# Patient Record
Sex: MALE | Race: WHITE
[De-identification: names, ages, dates, MRNs, and addresses within clinical notes are randomized per-mention and may not be internally consistent; named-entity substitution may affect disease eponyms.]

---

## 2018-02-01 ENCOUNTER — HOSPITAL ENCOUNTER (EMERGENCY)
Dept: HOSPITAL 31 - C.ER | Age: 68
Discharge: LEFT BEFORE DISCHARGE | End: 2018-02-01
Payer: COMMERCIAL

## 2018-02-01 VITALS
TEMPERATURE: 99.1 F | OXYGEN SATURATION: 96 % | HEART RATE: 86 BPM | SYSTOLIC BLOOD PRESSURE: 107 MMHG | DIASTOLIC BLOOD PRESSURE: 72 MMHG | RESPIRATION RATE: 16 BRPM

## 2018-02-01 VITALS — BODY MASS INDEX: 35.5 KG/M2

## 2018-02-01 DIAGNOSIS — Z02.89: Primary | ICD-10-CM

## 2018-02-01 DIAGNOSIS — F11.10: ICD-10-CM

## 2018-02-03 ENCOUNTER — HOSPITAL ENCOUNTER (EMERGENCY)
Dept: HOSPITAL 31 - C.ER | Age: 68
Discharge: HOME | End: 2018-02-03
Payer: MEDICARE

## 2018-02-03 VITALS
TEMPERATURE: 98.9 F | OXYGEN SATURATION: 99 % | HEART RATE: 57 BPM | DIASTOLIC BLOOD PRESSURE: 63 MMHG | SYSTOLIC BLOOD PRESSURE: 125 MMHG | RESPIRATION RATE: 18 BRPM

## 2018-02-03 VITALS — BODY MASS INDEX: 35.5 KG/M2

## 2018-02-03 DIAGNOSIS — F19.10: Primary | ICD-10-CM

## 2018-02-03 NOTE — C.PDOC
History Of Present Illness


67 year old male presents to the emergency room requesting detox. Patient has 

history of substance abuse. He offers no physical complaints at this time. 

Denies having suicidal or homicidal ideations.





PMD: None





Time Seen by Provider: 02/03/18 13:28


Chief Complaint (Nursing): Substance Abuse


History Per: Patient


History/Exam Limitations: no limitations





Past Medical History


Reviewed: Historical Data, Nursing Documentation, Vital Signs


Vital Signs: 


 Last Vital Signs











Temp  98.9 F   02/03/18 13:02


 


Pulse  57 L  02/03/18 13:02


 


Resp  18   02/03/18 13:02


 


BP  125/63   02/03/18 13:02


 


Pulse Ox  99   02/03/18 14:20














- Medical History


PMH: Asthma, Depression, HTN, Seizures


   Denies: Chronic Kidney Disease





- [facility] Procedures








INJECT/INFUSE NEC (03/24/15)








Family History: States: No Known Family Hx





- Social History


Hx Tobacco Use: No


Hx Alcohol Use: No


Hx Substance Use: Yes (THIS MORNING)





- Immunization History


Hx Tetanus Toxoid Vaccination: No


Hx Influenza Vaccination: No


Hx Pneumococcal Vaccination: No





Review Of Systems


Except As Marked, All Systems Reviewed And Found Negative.


Constitutional: Negative for: Fever, Chills


Psych: Negative for: Suicidal ideation (and homicidal)





Physical Exam





- Physical Exam


Appears: Well, Non-toxic, No Acute Distress


Skin: Normal Color, Warm, Dry


Head: Atraumatic, Normacephalic


Eye(s): bilateral: Normal Inspection


Oral Mucosa: Moist


Neck: Normal, Normal ROM


Chest: Symmetrical


Respiratory: No Accessory Muscle Use


Extremity: Normal ROM, No Deformity


Neurological/Psych: Oriented x3, Normal Speech





ED Course And Treatment


O2 Sat by Pulse Oximetry: 99 (RA)


Pulse Ox Interpretation: Normal


Progress Note: Case discussed with crisis worker who provided patient with a 

list of outside services available


Reassessment Condition: Unchanged





Medical Decision Making


Medical Decision Making: 





Time: 13:17


Initial Plan:


No detox beds are available today.


Patient seen by crisis worker and provided with outpatient resources. Advised 

to return to ER for any worsening symptoms. 





Disposition


Counseled Patient/Family Regarding: Diagnosis, Need For Followup





- Disposition


Referrals: 


Alcoholics Anonymous [Outside]


[facility] [Outside]


[facility] [Outside]


Disposition: HOME/ ROUTINE


Disposition Time: 14:01


Condition: STABLE


Additional Instructions: 


Follow up for outpatient services


Instructions:  Narcotic Abuse (ED)


Forms:  [facility] (English)





- POA


Present On Arrival: None





- Clinical Impression


Clinical Impression: 


 Substance abuse








- PA / NP / Resident Statement


MD/DO has reviewed & agrees with the documentation as recorded.





- Scribe Statement


The provider has reviewed the documentation as recorded by the Scribe (Georgia Castrejon)





All medical record entries made by the Scribe were at my direction and 

personally dictated by me. I have reviewed the chart and agree that the record 

accurately reflects my personal performance of the history, physical exam, 

medical decision making, and the department course for this patient. I have 

also personally directed, reviewed, and agree with the discharge instructions 

and disposition.

## 2018-02-08 ENCOUNTER — HOSPITAL ENCOUNTER (INPATIENT)
Dept: HOSPITAL 31 - C.ER | Age: 68
LOS: 5 days | Discharge: HOME | DRG: 895 | End: 2018-02-13
Attending: PSYCHIATRY & NEUROLOGY | Admitting: PSYCHIATRY & NEUROLOGY
Payer: COMMERCIAL

## 2018-02-08 VITALS — BODY MASS INDEX: 35.5 KG/M2

## 2018-02-08 DIAGNOSIS — G40.909: ICD-10-CM

## 2018-02-08 DIAGNOSIS — F11.23: Primary | ICD-10-CM

## 2018-02-08 DIAGNOSIS — F17.210: ICD-10-CM

## 2018-02-08 DIAGNOSIS — I10: ICD-10-CM

## 2018-02-08 DIAGNOSIS — R00.1: ICD-10-CM

## 2018-02-08 DIAGNOSIS — Z79.899: ICD-10-CM

## 2018-02-08 DIAGNOSIS — F32.9: ICD-10-CM

## 2018-02-08 DIAGNOSIS — J45.909: ICD-10-CM

## 2018-02-08 LAB
ALBUMIN SERPL-MCNC: 4.6 G/DL (ref 3.5–5)
ALBUMIN/GLOB SERPL: 1.1 {RATIO} (ref 1–2.1)
ALT SERPL-CCNC: 33 U/L (ref 21–72)
AST SERPL-CCNC: 24 U/L (ref 17–59)
BASOPHILS # BLD AUTO: 0.1 K/UL (ref 0–0.2)
BASOPHILS NFR BLD: 0.7 % (ref 0–2)
BILIRUB UR-MCNC: NEGATIVE MG/DL
BUN SERPL-MCNC: 16 MG/DL (ref 9–20)
CALCIUM SERPL-MCNC: 9.9 MG/DL (ref 8.6–10.4)
CAOX CRY #/AREA URNS HPF: (no result) /HPF
COLOR UR: YELLOW
EOSINOPHIL # BLD AUTO: 0.1 K/UL (ref 0–0.7)
EOSINOPHIL NFR BLD: 1 % (ref 0–4)
ERYTHROCYTE [DISTWIDTH] IN BLOOD BY AUTOMATED COUNT: 13.2 % (ref 11.5–14.5)
GFR NON-AFRICAN AMERICAN: > 60
GLUCOSE UR STRIP-MCNC: NORMAL MG/DL
HGB BLD-MCNC: 13.8 G/DL (ref 12–18)
LEUKOCYTE ESTERASE UR-ACNC: (no result) LEU/UL
LYMPHOCYTES # BLD AUTO: 2.5 K/UL (ref 1–4.3)
LYMPHOCYTES NFR BLD AUTO: 19.6 % (ref 20–40)
MCH RBC QN AUTO: 30.1 PG (ref 27–31)
MCHC RBC AUTO-ENTMCNC: 34.2 G/DL (ref 33–37)
MCV RBC AUTO: 88.2 FL (ref 80–94)
MONOCYTES # BLD: 0.8 K/UL (ref 0–0.8)
MONOCYTES NFR BLD: 6.5 % (ref 0–10)
NEUTROPHILS # BLD: 9.2 K/UL (ref 1.8–7)
NEUTROPHILS NFR BLD AUTO: 72.2 % (ref 50–75)
NRBC BLD AUTO-RTO: 0.2 % (ref 0–2)
PH UR STRIP: 5 [PH] (ref 5–8)
PLATELET # BLD: 246 K/UL (ref 130–400)
PMV BLD AUTO: 8.3 FL (ref 7.2–11.7)
PROT UR STRIP-MCNC: (no result) MG/DL
RBC # BLD AUTO: 4.59 MIL/UL (ref 4.4–5.9)
RBC # UR STRIP: NEGATIVE /UL
SP GR UR STRIP: 1.03 (ref 1–1.03)
SQUAMOUS EPITHIAL: < 1 /HPF (ref 0–5)
URINE NITRATE: NEGATIVE
UROBILINOGEN UR-MCNC: 2 MG/DL (ref 0.2–1)
WBC # BLD AUTO: 12.7 K/UL (ref 4.8–10.8)

## 2018-02-08 PROCEDURE — HZ2ZZZZ DETOXIFICATION SERVICES FOR SUBSTANCE ABUSE TREATMENT: ICD-10-PCS | Performed by: PSYCHIATRY & NEUROLOGY

## 2018-02-08 PROCEDURE — HZ56ZZZ INDIVIDUAL PSYCHOTHERAPY FOR SUBSTANCE ABUSE TREATMENT, PSYCHOEDUCATION: ICD-10-PCS | Performed by: PSYCHIATRY & NEUROLOGY

## 2018-02-08 PROCEDURE — HZ59ZZZ INDIVIDUAL PSYCHOTHERAPY FOR SUBSTANCE ABUSE TREATMENT, SUPPORTIVE: ICD-10-PCS | Performed by: PSYCHIATRY & NEUROLOGY

## 2018-02-08 NOTE — PCM.BM
<Vane Gonsalez - Last Filed: 02/08/18 17:44>





Treatment Plan Problems





- Problems identified on initial assessmt


  ** Potential for opiate withdrawal


Date Initiated: 02/08/18


Time Initiated: 17:45


Assessment reference: NA


Status: Active





Treatment assets and liabiliti


Patient Assests: ADL independent, negotiates basic needs, cognitively intact


Patient Liabilities: substance abuse, medical problems (HTN, prsotate 

enlargement), other (Wife primary caregiver)





- Milieu Protocol


Maintain good personal hygiene: daily Encourage regular showers, daily Remind 

patient to perform daily oral care, daily Assist patient to perform ADL's


Conduct patient checks and document Observation sheet: Q15 minutes


Maintain personal safety: every shift Educate patient to report safety concerns 

to staff, every shift Monitor environment for contraband/sharps


Medication safety: Monitor for expected outcome, potential side effects: every 

shift, Assess barriers to learning: every shift, Assess readiness for 

medication education: every shift





<Alanna Wolff - Last Filed: 02/09/18 08:54>





- Diagnosis


(1) Opioid use disorder, severe, dependence


Status: Acute   


Interventions: 





02/09/18 08:54


* Assess 7x/week regarding severity of withdrawal


* Educate regarding risks, benefits, side effects and alternatives of 

medications


* Use Motivational Interviewing for abstinence


* Use CBT for relapse prevention


* Medication management for withdrawal symptoms


* Encourage medication assisted treatment


*

## 2018-02-08 NOTE — C.PDOC
History Of Present Illness


66 y/o male presents to the ED requesting heroin detox. Patient admits to IV 

heroin abuse, stating he used around 8-10 bags earlier today. Patient denies 

any other drug use. He denies fever, chills, or any other physical complaints 

at this time. 


Chief Complaint (Nursing): Substance Abuse


History Per: Patient


History/Exam Limitations: no limitations


Onset/Duration Of Symptoms: Hrs


Current Symptoms Are (Timing): Still Present


Suicide/Self Injury Attempted (Context): None


Modifying Factor(s): Narcotics (heroin)


Associated Symptoms: denies: Suicidal Thoughts, Suicidal Plan


Involuntary Hold By: None


Recent travel outside of the [place]: No


Additional History Per: Patient





Past Medical History


Reviewed: Historical Data, Nursing Documentation, Vital Signs


Vital Signs: 


 Last Vital Signs











Temp  99.0 F   02/09/18 09:00


 


Pulse  56 L  02/09/18 09:00


 


Resp  18   02/09/18 09:00


 


BP  135/77   02/09/18 10:05


 


Pulse Ox  98   02/09/18 09:00














- Medical History


PMH: Asthma, Depression, HTN, Seizures


   Denies: Diabetes, Hepatitis, HIV, Chronic Kidney Disease, Sexually 

Transmitted Disease


Surgical History: No Surg Hx





- [facility] Procedures








INJECT/INFUSE NEC (03/24/15)








Family History: States: Unknown Family Hx





- Social History


Hx Tobacco Use: No


Hx Alcohol Use: No


Hx Substance Use: Yes (THIS MORNING)





- Immunization History


Hx Tetanus Toxoid Vaccination: No


Hx Influenza Vaccination: No


Hx Pneumococcal Vaccination: No





Review Of Systems


Psych: Positive for: Other (IV heroin detox ).  Negative for: Suicidal ideation





Physical Exam





- Physical Exam


Appears: Non-toxic, No Acute Distress


Skin: Normal Color, Warm, Dry


Head: Atraumatic, Normacephalic


Eye(s): bilateral: Normal Inspection


Oral Mucosa: Moist


Neck: Supple


Chest: Symmetrical, No Deformity, No Tenderness


Cardiovascular: Rhythm Regular, No Murmur


Respiratory: Normal Breath Sounds, No Rales, No Rhonchi, No Wheezing


Extremity: Normal ROM, Capillary Refill (less than 2 seconds )


Neurological/Psych: Oriented x3, Normal Speech, Normal Cognition


Gait: Steady





ED Course And Treatment





- Laboratory Results


Result Diagrams: 


 02/08/18 16:12





 02/08/18 16:12


O2 Sat by Pulse Oximetry: 98 (on RA)


Pulse Ox Interpretation: Normal





Medical Decision Making


Medical Decision Making: 


Progress:


Bloodwork and urinalysis ordered and reviewed. 








Disposition





- Disposition


Disposition: HOSPITALIZED


Disposition Time: 17:00


Condition: STABLE





- Clinical Impression


Clinical Impression: 


 Drug dependence, Drug abuse








- Scribe Statement


The provider has reviewed the documentation as recorded by the Scribe (Ivelisse Hanna)


Provider Attestation: 








All medical record entries made by the Scribe were at my direction and 

personally dictated by me. I have reviewed the chart and agree that the record 

accurately reflects my personal performance of the history, physical exam, 

medical decision making, and the department course for this patient. I have 

also personally directed, reviewed, and agree with the discharge instructions 

and disposition.

## 2018-02-09 RX ADMIN — PANTOPRAZOLE SODIUM SCH MG: 20 TABLET, DELAYED RELEASE ORAL at 10:05

## 2018-02-09 NOTE — PCM.PSYCH
Initial Psychiatric Evaluation





- Initial Psychiatric Evaluation


Type of Admission: Voluntary


Legal Status: Capacity


Chief Complaint (in patient's own words): 





"My stomach hurts"


History of Present Illness and Precipitating Events: 





The patient is seen chart reviewed and case discussed.


This is a 67-year-old  male,  with no children, retired, lives 

with wife who also uses heroin. He says that after he is done, his wife will 

come for detox.


The patient is using 10 bags heroin intravenously for the last 5 years. He 

denies using methadone or other painkillers.


He was introduced to heroin when his wife was sick and a friend gave heroin to 

her and then he started use as well, when he fell and had pain.


No past history of any opiate use and also he denies all other drug and alcohol 

use.


No treatment history.


He smokes 3-4 cigarettes a day.





Past psych history: Denies





Family psych history: Denies





Medical history: 1 history of seizure, hypertension, he had diverticulitis last 

year and treated with antibiotics.


Current Medications: 





Active Medications











Generic Name Dose Route Start Last Admin





  Trade Name Freq  PRN Reason Stop Dose Admin


 


Al Hydrox/Mg Hydrox/Simethicone  30 ml  02/08/18 21:24  





  Maalox 30 Ml  PO   





  TID PRN   





  Indigestion / Heartburn   


 


Aspirin  81 mg  02/09/18 10:00  02/09/18 10:05





  Ecotrin  PO   81 mg





  DAILY ROBERT   Administration


 


Benzocaine/Menthol  1 arnie  02/08/18 21:24  





  Cepacol Sore Throat  PO   





  QID PRN   





  Sore Throat   


 


Buprenorphine HCl  0 mg  02/10/18 11:19  





  Subutex  SL  02/17/18 11:18  





  .TAPER ROBERT   





  Taper   


 


Buprenorphine HCl  0 mg  02/10/18 10:00  





  Subutex  SL  02/15/18 09:59  





  DAILY ROBERT   





  Taper   


 


Clonidine HCl  0.1 mg  02/08/18 21:24  





  Catapres  PO   





  Q8 PRN   





  COWS Score More or Equal to 5   


 


Dicyclomine HCl  10 mg  02/09/18 09:57  02/09/18 10:05





  Bentyl  PO   10 mg





  Q6H PRN   Administration





  spasms   


 


Hydroxyzine HCl  25 mg  02/08/18 18:24  





  Atarax  PO   





  Q6 PRN   





  Anxiety   


 


Ibuprofen  600 mg  02/08/18 21:23  02/09/18 08:03





  Motrin Tab  PO   600 mg





  Q6H PRN   Administration





  Pain, Mild (1-3)   


 


Levetiracetam  500 mg  02/09/18 10:00  02/09/18 09:39





  Keppra  PO   500 mg





  BID ROBERT   Administration


 


Loperamide HCl  2 mg  02/08/18 21:24  





  Imodium  PO   





  Q8 PRN   





  Diarrhea   


 


Metoprolol Tartrate  25 mg  02/09/18 10:00  02/09/18 10:05





  Lopressor  PO   25 mg





  BID ROBERT   Administration


 


Ondansetron HCl  4 mg  02/08/18 21:24  





  Zofran Tab  PO   





  Q8 PRN   





  Nausea/Vomiting   


 


Pantoprazole Sodium  20 mg  02/09/18 10:00  02/09/18 10:05





  Protonix Ec Tab  PO   20 mg





  DAILY ROBERT   Administration


 


Pneumococcal Polyvalent Vaccine  0.5 ml  02/10/18 09:00  





  Pneumovax 23 Vaccine  IM  02/10/18 09:01  





  .ONCE ONE   


 


Pseudoephedrine HCl  60 mg  02/08/18 21:24  





  Sudafed Tab  PO   





  QID PRN   





  Nasal/Sinus Congestion   


 


Trazodone HCl  50 mg  02/08/18 18:23  





  Desyrel  PO   





  HS PRN   





  insomnia   














Past Psychiatric History





- Past Psychiatric History


Previous Treatment History: None


Pertinent Medical Hx (Current Medical&Sleep Prob, Allergies): 





 Allergies











Allergy/AdvReac Type Severity Reaction Status Date / Time


 


Penicillins Allergy   Verified 02/08/18 15:12








 





Levetiracetam [Keppra] 500 mg PO BID 07/07/14 


Aspirin 81 mg PO DAILY 03/24/15 


Ibuprofen [Motrin Tab] 600 mg PO Q6H PRN #30 tab 03/24/15 











Review of Systems





- Gastrointestinal


Gastrointestinal: Abdominal Pain





- Neurological


Neurological: UNREMARKABLE





- Psychiatric


Psychiatric: Abnormal Sleep Pattern, Anhedonia, Anxiety, Difficulty 

Concentrating, Irritability.  absent: Hallucinations, Homicidal Ideation, 

Suicidal Ideation





Mental Status Examination





- Personal Presentation


Personal Presentation: Looks stated age





- Affect


Affect: Constricted





- Motor Activity


Motor Activity: Calm





- Reliability in Providing Information


Reliability in Providing Information: Good





- Speech


Speech: Organized





- Mood


Mood: Anxious





- Formal Thought Process


Formal Thought Process: No Impairment





- Cognitive Functions


Orientation: Person, Place, Situation, Time


Sensorium: Alert


Attention/Concentration: Easily distracted


Estimate of Intelligence: Average


Judgement: Intact, as evidence by: Insight regarding need for hospitalization


Memory: Recent intact, as evidence by: Ability to recall events of the day, 

Remote intact, as evidenced by: Abilit to recall sig. life events





- Risk


Risk: Withdrawal, Diminished functioning





- Strength & Assets Inventory


Strength & Assets Inventory: Family support, Cooperative





- Limitations


Limitations: Other





DSM 5 DX





- DSM 5


DSM 5 Diagnosis: 





Opioid withdrawal


Opioid use d/o -severe








- Recommended/Plan of Treatment


Treatment Recommendations and Plan of Treatment: 





Subutex detox started 8 mg last nidnight, 8 mg this afternoon and will taper off


All risks, benefits and alternatives of the meds discussed,


 and the pt agreed and understood. 


Attend groups and activities


Individual therapy daily


Psychoeducation and support daily


Encourage compliance with meds and after care


Refer to outpatient program 


Teach healthy lifestyle methods, i.e. diet, exercise, meditation


Smoking cessation 


GI cons if pain does not improve


Protonix, prn bentyl


Consider CT scan





32 min


Projected ELOS: 6 days


Prognosis: good w treatment





- Smoking Cessation


Smoking Cessation Initiated: Yes

## 2018-02-10 LAB
ALBUMIN SERPL-MCNC: 4.1 G/DL (ref 3.5–5)
ALBUMIN/GLOB SERPL: 1.1 {RATIO} (ref 1–2.1)
ALT SERPL-CCNC: 32 U/L (ref 21–72)
AMYLASE SERPL-CCNC: 159 U/L (ref 30–110)
AST SERPL-CCNC: 22 U/L (ref 17–59)
BASOPHILS # BLD AUTO: 0.1 K/UL (ref 0–0.2)
BASOPHILS NFR BLD: 0.6 % (ref 0–2)
BUN SERPL-MCNC: 19 MG/DL (ref 9–20)
CALCIUM SERPL-MCNC: 9.6 MG/DL (ref 8.6–10.4)
EOSINOPHIL # BLD AUTO: 0.2 K/UL (ref 0–0.7)
EOSINOPHIL NFR BLD: 1.5 % (ref 0–4)
ERYTHROCYTE [DISTWIDTH] IN BLOOD BY AUTOMATED COUNT: 13.2 % (ref 11.5–14.5)
GFR NON-AFRICAN AMERICAN: > 60
HGB BLD-MCNC: 12.8 G/DL (ref 12–18)
LIPASE: 170 U/L (ref 23–300)
LYMPHOCYTES # BLD AUTO: 3.2 K/UL (ref 1–4.3)
LYMPHOCYTES NFR BLD AUTO: 27.4 % (ref 20–40)
MAGNESIUM SERPL-MCNC: 1.9 MG/DL (ref 1.6–2.3)
MCH RBC QN AUTO: 30 PG (ref 27–31)
MCHC RBC AUTO-ENTMCNC: 33.7 G/DL (ref 33–37)
MCV RBC AUTO: 89.3 FL (ref 80–94)
MONOCYTES # BLD: 0.8 K/UL (ref 0–0.8)
MONOCYTES NFR BLD: 6.8 % (ref 0–10)
NEUTROPHILS # BLD: 7.5 K/UL (ref 1.8–7)
NEUTROPHILS NFR BLD AUTO: 63.7 % (ref 50–75)
NRBC BLD AUTO-RTO: 0 % (ref 0–2)
PLATELET # BLD: 229 K/UL (ref 130–400)
PMV BLD AUTO: 8.8 FL (ref 7.2–11.7)
RBC # BLD AUTO: 4.26 MIL/UL (ref 4.4–5.9)
WBC # BLD AUTO: 11.8 K/UL (ref 4.8–10.8)

## 2018-02-10 RX ADMIN — A/SINGAPORE/GP1908/2015 IVR-180 (H1N1) (AN A/MICHIGAN/45/2015-LIKE VIRUS), A/SINGAPORE/GP2050/2015 (H3N2) (AN A/HONG KONG/4801/2014 - LIKE VIRUS), B/UTAH/9/2014 (A B/PHUKET/3073/2013-LIKE VIRUS), B/HONG KONG/259/2010 (A B/BRISBANE/60/08-LIKE VIRUS) ONE: 15; 15; 15; 15 INJECTION, SUSPENSION INTRAMUSCULAR at 09:22

## 2018-02-10 RX ADMIN — PANTOPRAZOLE SODIUM SCH MG: 20 TABLET, DELAYED RELEASE ORAL at 09:22

## 2018-02-10 RX ADMIN — BUPRENORPHINE HCL SCH MG: 2 TABLET SUBLINGUAL at 09:23

## 2018-02-10 NOTE — CP.PCM.CON
History of Present Illness





- History of Present Illness


History of Present Illness: 





Medicine consult for by PGY2 for Dr. KEVIN Hanna





67 year old male with past medical history of HTN, diverticulitis treated with 

Abx 1 month ago and seizure disorder is being seen for diffuse abdominal pain.  

Patient states that abdominal pain began 2 days ago when he was admitted.  Pain 

is constant but varies in severity.  Pain is diffusely present and feels like 

cramping pain that changes to sharp pains.  Pain is associated with loose 

stools.  Patient states that he has had about 3 bowel movements a day since 

admission 2 days ago.  Denies having any nausea, vomiting.  Patient does 

complain of decreased appetite.  Patient was treated one month ago for 

diverticlutis at [facility] with cipro and flagyl for 7 days outpt.  Pt 

states at that time he also had abdominal pain but was not similar to the pain 

he is having right now. Denies having any CP, SOB, F/C, hematemeisis, or 

hemotochezia.  Denies having any tarry black stools.  12 point ROS negative 

except for above mentioned. 





PMHx: stated above


Sx: salivary gland removal in left side due to stone


Social: smokes 3-4 cigs/day x 30 yrs, no ETOH use.  Uses IV heroin daily.  

Never had detox before


PMD: Dr. Zhen Napoles; Keppra 500 mg PO BID, Metoprolol 25 mg PO BID  





Review of Systems





- Constitutional


Constitutional: As Per HPI.  absent: Chills, Fever





- EENT


Eyes: absent: Change in Vision, Loss of Peripheral Vision, Other Visual 

Disturbances


Nose/Mouth/Throat: absent: Nasal Congestion, Nasal Discharge, Sore Throat, Neck 

Pain





- Cardiovascular


Cardiovascular: absent: Chest Pain, Dyspnea, Leg Edema, Palpitations, Pedal 

Edema





- Respiratory


Respiratory: absent: Cough, Dyspnea, Dyspnea on Exertion, Wheezing, Chest 

Congestion





- Gastrointestinal


Gastrointestinal: Abdominal Pain, Cramping, Diarrhea.  absent: Belching, 

Bloating, Constipation, Dysphagia, Hematemesis, Hematochezia, Melena, Nausea, 

Vomiting





- Genitourinary


Genitourinary: absent: Dysuria, Urinary Frequency





- Musculoskeletal


Musculoskeletal: absent: Back Pain





- Integumentary


Integumentary: absent: Lesions, Rash





- Neurological


Neurological: absent: Tremor, Weakness





Past Patient History





- Infectious Disease


Hx of Infectious Diseases: None





- Past Medical History & Family History


Past Medical History?: Yes





- Past Social History


Smoking Status: Heavy Smoker > 10 Cigarettes Daily


Chewing Tobacco Use: No


Cigar Use: No


Alcohol: None


Drugs: Opiates


Home Situation {Lives}: With Family





- CARDIAC


Hx Hypertension: Yes





- PULMONARY


Hx Asthma: Yes





- NEUROLOGICAL


Hx Seizures: Yes





- HEENT


Hx HEENT Problems:  (STONES REMOVED IN THE SALIVARY GLAND)





- RENAL


Hx Chronic Kidney Disease: No





- ENDOCRINE/METABOLIC


Hx Endocrine Disorders: No





- HEMATOLOGICAL/ONCOLOGICAL


Hx Human Immunodeficiency Virus (HIV): No





- INTEGUMENTARY


Hx Dermatological Problems: No





- MUSCULOSKELETAL/RHEUMATOLOGICAL


Hx Falls: Yes (H/O FALLS 5 YRS OLD FELL HIT HEAD SWELLING)





- GASTROINTESTINAL


Hx Gastrointestinal Disorders: No





- GENITOURINARY/GYNECOLOGICAL


Hx Sexually Transmitted Disorders: No





- PSYCHIATRIC


Hx Substance Use: Yes





- SURGICAL HISTORY


Other/Comment: SALIVARY GLAND REMOVED





- ANESTHESIA


Hx Anesthesia: No


Hx Anesthesia Reactions: No





Meds


Allergies/Adverse Reactions: 


 Allergies











Allergy/AdvReac Type Severity Reaction Status Date / Time


 


Penicillins Allergy   Verified 02/08/18 15:12














- Medications


Medications: 


 Current Medications





Al Hydrox/Mg Hydrox/Simethicone (Maalox 30 Ml)  30 ml PO TID PRN


   PRN Reason: Indigestion / Heartburn


Aspirin (Ecotrin)  81 mg PO DAILY [facility]


   Last Admin: 02/10/18 09:22 Dose:  81 mg


Benzocaine/Menthol (Cepacol Sore Throat)  1 arnie PO QID PRN


   PRN Reason: Sore Throat


Buprenorphine HCl (Subutex)  8 mg SL DAILY [facility]


   PRN Reason: Taper


   Stop: 02/15/18 09:59


   Last Admin: 02/10/18 09:23 Dose:  8 mg


Clonidine HCl (Catapres)  0.1 mg PO Q8 PRN


   PRN Reason: COWS Score More or Equal to 5


Dicyclomine HCl (Bentyl)  10 mg PO Q6H PRN


   PRN Reason: spasms


   Last Admin: 02/10/18 06:20 Dose:  10 mg


Hydroxyzine HCl (Atarax)  25 mg PO Q6 PRN


   PRN Reason: Anxiety


Ibuprofen (Motrin Tab)  600 mg PO Q6H PRN


   PRN Reason: Pain, Mild (1-3)


   Last Admin: 02/10/18 01:43 Dose:  600 mg


Levetiracetam (Keppra)  500 mg PO BID [facility]


   Last Admin: 02/10/18 09:21 Dose:  500 mg


Loperamide HCl (Imodium)  2 mg PO Q8 PRN


   PRN Reason: Diarrhea


Metoprolol Tartrate (Lopressor)  25 mg PO BID [facility]


   Last Admin: 02/10/18 09:21 Dose:  25 mg


Ondansetron HCl (Zofran Tab)  4 mg PO Q8 PRN


   PRN Reason: Nausea/Vomiting


Pantoprazole Sodium (Protonix Ec Tab)  20 mg PO DAILY [facility]


   Last Admin: 02/10/18 09:22 Dose:  20 mg


Pseudoephedrine HCl (Sudafed Tab)  60 mg PO QID PRN


   PRN Reason: Nasal/Sinus Congestion


Trazodone HCl (Desyrel)  50 mg PO HS PRN


   PRN Reason: insomnia











Physical Exam





- Constitutional


Appears: Non-toxic, No Acute Distress





- Head Exam


Head Exam: ATRAUMATIC





- Eye Exam


Eye Exam: EOMI, PERRL





- ENT Exam


ENT Exam: Mucous Membranes Moist





- Respiratory Exam


Respiratory Exam: Clear to Auscultation Bilateral.  absent: Accessory Muscle Use

, Rales, Rhonchi, Wheezes, Respiratory Distress





- Cardiovascular Exam


Cardiovascular Exam: REGULAR RHYTHM, +S1, +S2.  absent: Diastolic murmur, Gallop

, Rubs, Systolic Murmur





- GI/Abdominal Exam


GI & Abdominal Exam: Normal Bowel Sounds, Soft.  absent: Distended, Firm, 

Guarding, Organomegaly, Rebound, Rigid, Tenderness





- Extremities Exam


Extremities exam: Negative for: calf tenderness, pedal edema, tenderness





- Neurological Exam


Neurological exam: Alert, Oriented x3





- Psychiatric Exam


Psychiatric exam: Normal Affect, Normal Mood





- Skin


Skin Exam: Dry, Intact, Normal Color, Warm





Results





- Vital Signs


Recent Vital Signs: 


 Last Vital Signs











Temp  98.9 F   02/10/18 14:33


 


Pulse  88   02/10/18 14:33


 


Resp  20   02/10/18 14:33


 


BP  145/90   02/10/18 14:33


 


Pulse Ox  99   02/10/18 14:33














- Labs


Result Diagrams: 


 02/08/18 16:12





 02/08/18 16:12





Assessment & Plan





- Assessment and Plan (Free Text)


Assessment: 





67 year old male with past medical history of HTN, seizure D/O, diverticulitis 

tx with Abx is being seen for diffuse abdominal pain.





Abdominal pain


Likely 2/2 opiate withdrawal symptoms 


Blood work from admission showed a slight elevated WBC count at 12.7.  Pt has 

been afebrile since admission.  


Will check CBC, CMP, lipase


Will get CT of abd/pelvis with PO and IV contrast 


continue protonix, zofran prn for nausea, imodium prn for diarrhea 


Pt started on bentyl prn 





HTN


Will hold home medication lopressor due to occasional bradycardia


Will consider switching pt to ACE or ARB for blood pressure control





Seizure disorder


Continue home medication keppra 500 mg po bid





Opiate abuse


Currently on Subutex detox.  Continue management per psych team





Prophylaxis


Continue protonix


Pt ambulating around room.  No indication for SCDs





Case will be discussed with attending, Dr. KEVIN Hanna 








- Date & Time


Date: 02/10/18


Time: 16:00

## 2018-02-10 NOTE — PCM.PYCHPN
Psychiatric Progress Note





- Psychiatric Progress Note


Patient seen today, length of contact: 16 min


Patient Chief Complaint: 





"My stomach is a little better but not much"


Problems Identified/Issues Discussed: 





The pt is seen, chart reviewed, case discussed with staff.


The pt is compliant with medications and reports no side-effects.


Symptoms are improving but needs more time to stabilize.


His GI sxs continue, so consult ordered, labs ordered. 


After care discussed, support and psychoeducation given.





Medication Change: Yes (detox changes daily)


Medical Record Reviewed: Yes





Mental Status Examination





- Cognitive Function


Orientation: Person, Place, Situation, Time


Memory: Intact


Attention: Poor


Concentration: Poor


Association: WNL


Fund of Knowledge: WNL





- Mood


Mood: Anxious





- Affect


Affect: Constricted





- Speech


Speech: Appropriate





- Formal Thought Process


Formal Thought Process: No Impairment





- Suicidal Ideation


Suicidal Ideation: No





- Homicidal Ideation


Homicidal Ideation: No





Goal/Treatment Plan





- Goal/Treatment Plan


Need for Continued Stay: Discharge may exacerbated symptoms, Severe functional 

impairment


Progress Toward Problem(s) and Goals/Treatment Plan: 





Subutex detox 


All risks, benefits and alternatives of the meds discussed,


 and the pt agreed and understood. 


Attend groups and activities


Individual therapy daily


Psychoeducation and support daily


Encourage compliance with meds and after care


Refer to outpatient program 


Teach healthy lifestyle methods, i.e. diet, exercise, meditation


Smoking cessation 


GI consult


Protonix, prn bentyl


CT scan

## 2018-02-11 RX ADMIN — A/SINGAPORE/GP1908/2015 IVR-180 (H1N1) (AN A/MICHIGAN/45/2015-LIKE VIRUS), A/SINGAPORE/GP2050/2015 (H3N2) (AN A/HONG KONG/4801/2014 - LIKE VIRUS), B/UTAH/9/2014 (A B/PHUKET/3073/2013-LIKE VIRUS), B/HONG KONG/259/2010 (A B/BRISBANE/60/08-LIKE VIRUS) ONE MCG: 15; 15; 15; 15 INJECTION, SUSPENSION INTRAMUSCULAR at 20:19

## 2018-02-11 RX ADMIN — BUPRENORPHINE HCL SCH MG: 2 TABLET SUBLINGUAL at 09:02

## 2018-02-11 RX ADMIN — PANTOPRAZOLE SODIUM SCH MG: 20 TABLET, DELAYED RELEASE ORAL at 09:00

## 2018-02-11 NOTE — PCM.PYCHPN
Psychiatric Progress Note





- Psychiatric Progress Note


Patient seen today, length of contact: 16 min


Patient Chief Complaint: 





"Less pain"


Problems Identified/Issues Discussed: 


The pt is seen, chart reviewed, case discussed with staff.


Support given, CBT and MI used briefly


No new symptoms reported, improving slowly and needs more time


No SEs from medications, risks discussed.


After care discussed


CT scan came unlikely diverticulitis but he may have a small mass and will need 

outpt colonoscopy


Medication Change: Yes (detox changes daily)


Medical Record Reviewed: Yes





Mental Status Examination





- Cognitive Function


Orientation: Person, Place, Situation, Time


Memory: Intact


Attention: Poor


Concentration: Poor


Association: WNL


Fund of Knowledge: WNL





- Mood


Mood: Anxious





- Affect


Affect: Constricted





- Speech


Speech: Appropriate





- Formal Thought Process


Formal Thought Process: No Impairment





- Suicidal Ideation


Suicidal Ideation: No





- Homicidal Ideation


Homicidal Ideation: No





Goal/Treatment Plan





- Goal/Treatment Plan


Need for Continued Stay: Discharge may exacerbated symptoms, Severe functional 

impairment


Progress Toward Problem(s) and Goals/Treatment Plan: 





Subutex detox 


All risks, benefits and alternatives of the meds discussed,


 and the pt agreed and understood. 


Attend groups and activities


Individual therapy daily


Psychoeducation and support daily


Encourage compliance with meds and after care


Refer to outpatient program 


Teach healthy lifestyle methods, i.e. diet, exercise, meditation


Smoking cessation 


Med consult appreciated


Protonix, prn bentyl


CT scan done

## 2018-02-11 NOTE — CT
PROCEDURE:  CT Abdomen and Pelvis with oral and  IV contrast.



HISTORY:

Hx Diverticulitis. Diffuse Abdominal Pain.



COMPARISON:

None available.



TECHNIQUE:

Contiguous axial images of the abdomen and pelvis. Oral and IV 

contrast was administered. Coronal and Sagittal reformats generated 

and reviewed. 



Contrast dose: 100 mL Visipaque 320



Radiation dose:



Total exam DLP = 435.14 mGy-cm.



This CT exam was performed using one or more of the following dose 

reduction techniques: Automated exposure control, adjustment of the 

mA and/or kV according to patient size, and/or use of iterative 

reconstruction technique.



FINDINGS:





LOWER THORAX:

No visible consolidation, pleural effusion, or pneumothorax. 



Small hiatal hernia/distal esophageal wall thickening. 



LIVER:

Unremarkable.



GALLBLADDER AND BILE DUCTS:

Unremarkable. 



PANCREAS:

Unremarkable.



SPLEEN:

Unremarkable. 



ADRENALS:

Unremarkable. 



KIDNEYS AND URETERS:

The kidneys enhance symmetrically. No hydronephrosis or obstructing 

renal calculus. 



BLADDER:

The urinary bladder appears unremarkable.



REPRODUCTIVE:

The prostate gland measures approximately 3.2 x 4.5 cm



APPENDIX:

The appendix appears within normal limits of caliber. No secondary 

signs of acute appendicitis.



BOWEL:

The stomach is nondistended. 



The bowel loops appear within normal limits of caliber without 

evidence of intestinal obstruction. Diverticulosis without CT 

evidence of acute diverticulitis. Short segment of wall 

thickening/narrowing may be related to spasm or underdistention 

however neoplasm cannot be entirely excluded (series 3, image 123) ; 

correlate with colonoscopy if indicated. 



PERITONEUM:

No significant free fluid. No definite free air.



LYMPH NODES:

No bulky lymphadenopathy identified.



VASCULATURE:

No aortic aneurysm. 



BONES:

Mild degenerative changes.



OTHER FINDINGS:

Tiny fat containing umbilical hernia. 



IMPRESSION:

Diverticulosis without CT evidence of acute diverticulitis. 



Short segment (Approximately 2.8 cm) of wall thickening/narrowing may 

be related to spasm or underdistention however neoplasm cannot be 

entirely excluded (series 3, image 123) ; correlate with colonoscopy 

if indicated.

## 2018-02-11 NOTE — CP.PCM.CON
History of Present Illness





- History of Present Illness


History of Present Illness: 





Hospitalist Consult Note 





Patient was seen and examined at 8:00  B 2/11/18





67 year old male who was admitted to the Detox Unit for Hx of Heroin Abuse (

roughly 10 bags per day with last use on Thursday 2/8/18). Hospitalist service 

was consulted because of patients complaints of generalized abdominal pain. He 

stated that he recently completed a 7 day course of Cipro and Flagyl for 

treatment of Diverticulitis that was diagnosed roughly 2 weeks ago.





Upon FULL ROS


NO dysphagia/odynopahgia


NO soreness in throat


NO cough


NO sinus/nasal congestion


NO fever/chills


NO muscle aches/pains


NO joint pain


NO chest pain/palpations


NO SOB


NO abdominal pain


NO n/v/d/c


NO burning pain with urination


NO HA


NO paresthesias





Exam:


General: AAOX3, NAD


HEENT: NCA, EOMI, PERRLA, NO cervical/supraclavicular/submandibular 

lymphadenopathy, NO pharyngeal erythema/exudate, Nasal Turbinates are 

nonerythematous/nonedematous, Oral Mucosa is moist


Cardio: NS1 and NS2, NO M/R/G


Resp: CTA B/L, NO R/R/W


GI: BSx4, Soft, NT, NO HSM, NO guarding/rebound tenderness


Ext: Pulses are strong and equal, Capillary Refill is 2 seconds, NO edema


Neuro: CN II through XII are grossly intact





Assessment and Plan:





1). Generalized Abdominal Pain with recent history of Diverticulitis


This pain has resolved and he is currently not experiencing it.


On exam on 2/10/18 evening it was described to me as a cramping type of 

abdominal pain and again this is NO longer present


Entire exam as documented above was completely unremarkable and he is 

tolerating his meals, NO n/v, he is moving his bowels normally.


The abdominal pain is likely secondary to Heroin withdrawl.


However, considering his recent bout of Diverticulitis, CT Abdomen/Pelvis with 

PO and IV contrast has been ordered to make sure that there is no other 

etiology.


I spoke with CT department and PO contrast 50 ml Omnipaque has been ordered to 

be diluted in 800 ml of water to be given to patient and then once finished the 

staff in the Detox unit are to call CT department at [phone] so that the 

study can be performed at the appropriate time interval which is roughly 1 hour 

after finishing the Omnipaque.





Gino Hanna D.O.





Past Patient History





- Infectious Disease


Hx of Infectious Diseases: None





- Past Medical History & Family History


Past Medical History?: Yes





- Past Social History


Smoking Status: Heavy Smoker > 10 Cigarettes Daily


Chewing Tobacco Use: No


Cigar Use: No


Alcohol: None


Drugs: Opiates


Home Situation {Lives}: With Family





- CARDIAC


Hx Hypertension: Yes





- PULMONARY


Hx Asthma: Yes





- NEUROLOGICAL


Hx Seizures: Yes





- HEENT


Hx HEENT Problems:  (STONES REMOVED IN THE SALIVARY GLAND)





- RENAL


Hx Chronic Kidney Disease: No





- ENDOCRINE/METABOLIC


Hx Endocrine Disorders: No





- HEMATOLOGICAL/ONCOLOGICAL


Hx Human Immunodeficiency Virus (HIV): No





- INTEGUMENTARY


Hx Dermatological Problems: No





- MUSCULOSKELETAL/RHEUMATOLOGICAL


Hx Falls: Yes (H/O FALLS 5 YRS OLD FELL HIT HEAD SWELLING)





- GASTROINTESTINAL


Hx Gastrointestinal Disorders: No





- GENITOURINARY/GYNECOLOGICAL


Hx Sexually Transmitted Disorders: No





- PSYCHIATRIC


Hx Substance Use: Yes





- SURGICAL HISTORY


Other/Comment: SALIVARY GLAND REMOVED





- ANESTHESIA


Hx Anesthesia: No


Hx Anesthesia Reactions: No





Meds


Allergies/Adverse Reactions: 


 Allergies











Allergy/AdvReac Type Severity Reaction Status Date / Time


 


Penicillins Allergy   Verified 02/08/18 15:12














- Medications


Medications: 


 Current Medications





Al Hydrox/Mg Hydrox/Simethicone (Maalox 30 Ml)  30 ml PO TID PRN


   PRN Reason: Indigestion / Heartburn


Aspirin (Ecotrin)  81 mg PO DAILY [facility]


   Last Admin: 02/10/18 09:22 Dose:  81 mg


Benzocaine/Menthol (Cepacol Sore Throat)  1 arnie PO QID PRN


   PRN Reason: Sore Throat


Buprenorphine HCl (Subutex)  8 mg SL DAILY ROBERT


   PRN Reason: Taper


   Stop: 02/15/18 09:59


   Last Admin: 02/10/18 09:23 Dose:  8 mg


Clonidine HCl (Catapres)  0.1 mg PO Q8 PRN


   PRN Reason: COWS Score More or Equal to 5


Dicyclomine HCl (Bentyl)  10 mg PO Q6H PRN


   PRN Reason: spasms


   Last Admin: 02/10/18 06:20 Dose:  10 mg


Hydroxyzine HCl (Atarax)  25 mg PO Q6 PRN


   PRN Reason: Anxiety


Ibuprofen (Motrin Tab)  600 mg PO Q6H PRN


   PRN Reason: Pain, Mild (1-3)


   Last Admin: 02/10/18 01:43 Dose:  600 mg


Levetiracetam (Keppra)  500 mg PO BID [facility]


   Last Admin: 02/10/18 17:46 Dose:  500 mg


Loperamide HCl (Imodium)  2 mg PO Q8 PRN


   PRN Reason: Diarrhea


Metoprolol Tartrate (Lopressor)  25 mg PO BID [facility]


   Last Admin: 02/10/18 17:45 Dose:  25 mg


Ondansetron HCl (Zofran Tab)  4 mg PO Q8 PRN


   PRN Reason: Nausea/Vomiting


Pantoprazole Sodium (Protonix Ec Tab)  20 mg PO DAILY [facility]


   Last Admin: 02/10/18 09:22 Dose:  20 mg


Pseudoephedrine HCl (Sudafed Tab)  60 mg PO QID PRN


   PRN Reason: Nasal/Sinus Congestion


Trazodone HCl (Desyrel)  50 mg PO HS PRN


   PRN Reason: insomnia











Results





- Vital Signs


Recent Vital Signs: 


 Last Vital Signs











Temp  98.3 F   02/11/18 06:42


 


Pulse  61   02/11/18 06:42


 


Resp  18   02/11/18 06:42


 


BP  122/80   02/11/18 06:42


 


Pulse Ox  96   02/11/18 06:42














- Labs


Result Diagrams: 


 02/10/18 19:41





 02/10/18 19:41


Labs: 


 Laboratory Results - last 24 hr











  02/10/18 02/10/18 02/10/18





  19:41 19:41 19:41


 


WBC  11.8 H  


 


RBC  4.26 L  


 


Hgb  12.8  


 


Hct  38.0  


 


MCV  89.3  


 


MCH  30.0  


 


MCHC  33.7  


 


RDW  13.2  


 


Plt Count  229  


 


MPV  8.8  


 


Neut % (Auto)  63.7  


 


Lymph % (Auto)  27.4  


 


Mono % (Auto)  6.8  


 


Eos % (Auto)  1.5  


 


Baso % (Auto)  0.6  


 


Neut # (Auto)  7.5 H  


 


Lymph # (Auto)  3.2  


 


Mono # (Auto)  0.8  


 


Eos # (Auto)  0.2  


 


Baso # (Auto)  0.1  


 


Sodium   139 


 


Potassium   4.1 


 


Chloride   99 


 


Carbon Dioxide   30 


 


Anion Gap   14 


 


BUN   19 


 


Creatinine   1.1 


 


Est GFR ( Amer)   > 60 


 


Est GFR (Non-Af Amer)   > 60 


 


Random Glucose   95 


 


Calcium   9.6 


 


Phosphorus   4.5 


 


Magnesium   1.9 


 


Total Bilirubin   0.6 


 


AST   22 


 


ALT   32 


 


Alkaline Phosphatase   72 


 


Total Protein   8.0 


 


Albumin   4.1 


 


Globulin   3.9 


 


Albumin/Globulin Ratio   1.1 


 


Amylase   159 H 


 


Lipase   170 


 


25-OH Vitamin D Total    23.6 L


 


TSH 3rd Generation   0.95

## 2018-02-12 RX ADMIN — PANTOPRAZOLE SODIUM SCH MG: 20 TABLET, DELAYED RELEASE ORAL at 09:55

## 2018-02-12 RX ADMIN — BUPRENORPHINE HCL SCH MG: 2 TABLET SUBLINGUAL at 09:54

## 2018-02-12 NOTE — PCM.PYCHPN
Psychiatric Progress Note





- Psychiatric Progress Note


Patient seen today, length of contact: 15 min


Patient Chief Complaint: 





"Not well"


Problems Identified/Issues Discussed: 





Seen again, chart reviewed


Medicine closed the case


He is sytill complaining but less o


Overall improved


Support and MI used


Medication Change: Yes (detox changes daily)


Medical Record Reviewed: Yes





Mental Status Examination





- Cognitive Function


Orientation: Person, Place, Situation, Time


Memory: Intact


Attention: Poor


Concentration: Poor


Association: WNL


Fund of Knowledge: WNL





- Mood


Mood: Anxious





- Affect


Affect: Constricted





- Speech


Speech: Appropriate





- Formal Thought Process


Formal Thought Process: No Impairment





- Suicidal Ideation


Suicidal Ideation: No





- Homicidal Ideation


Homicidal Ideation: No





Goal/Treatment Plan





- Goal/Treatment Plan


Need for Continued Stay: Discharge may exacerbated symptoms, Severe functional 

impairment


Progress Toward Problem(s) and Goals/Treatment Plan: 





Subutex detox 


All risks, benefits and alternatives of the meds discussed,


 and the pt agreed and understood. 


Attend groups and activities


Individual therapy daily


Psychoeducation and support daily


Encourage compliance with meds and after care


Refer to outpatient program 


Teach healthy lifestyle methods, i.e. diet, exercise, meditation


Smoking cessation 


Med consult appreciated


Protonixmikkin bentyl


CT scan done

## 2018-02-13 VITALS
DIASTOLIC BLOOD PRESSURE: 91 MMHG | HEART RATE: 81 BPM | OXYGEN SATURATION: 96 % | RESPIRATION RATE: 20 BRPM | TEMPERATURE: 98.5 F | SYSTOLIC BLOOD PRESSURE: 144 MMHG

## 2018-02-13 RX ADMIN — PANTOPRAZOLE SODIUM SCH MG: 20 TABLET, DELAYED RELEASE ORAL at 09:10

## 2018-02-13 RX ADMIN — BUPRENORPHINE HCL SCH MG: 2 TABLET SUBLINGUAL at 09:11

## 2018-02-13 NOTE — PCM.PYCHDC
Mental Status Examination





- Mental Status Examination


Orientation: Person





Discharge Summary





- Discharge Note


Consultations:: List each consultation separately and include:  1. Reason for 

request.  2. Findings.  3. Follow-up


Summary of Hospital Course include:: 1. Description of specific treatment plan 

utilized for patients during their course of treatmen.  2. Summarize the time-

course for resolution of acute symptoms and/or regressed behaviors.  3. 

Describe issues identified and worked on during hospitalization.  4. Describe 

medication utilized.  5. Describe medical problems identified and treated.  6. 

Reassessment of suicide risk


Summary of Hospital Course: 





The patient is seen chart reviewed and case discussed.


This is a 67-year-old  male,  with no children, retired, lives 

with wife who also uses heroin. He says that after he is done, his wife will 

come for detox.


The patient is using 10 bags heroin intravenously for the last 5 years. He 

denies using methadone or other painkillers.


He was introduced to heroin when his wife was sick and a friend gave heroin to 

her and then he started use as well, when he fell and had pain.


No past history of any opiate use and also he denies all other drug and alcohol 

use.


No treatment history.


He smokes 3-4 cigarettes a day.





Past psych history: Denies





Family psych history: Denies





Medical history: 1 history of seizure, hypertension, he had diverticulitis last 

year and treated with antibiotics.





- Diagnosis


(1) Opioid use disorder, severe, dependence


Current Visit: Yes   Status: Acute   





- Final Diagnosis (DSM 5)


Condition upon Discharge: STABLE


Disposition: HOME/ ROUTINE


Follow-up Treatment Plan: 





Subutex detox 


All risks, benefits and alternatives of the meds discussed,


 and the pt agreed and understood. 


Attend groups and activities


Individual therapy daily


Psychoeducation and support daily


Encourage compliance with meds and after care


Refer to outpatient program 


Teach healthy lifestyle methods, i.e. diet, exercise, meditation


Smoking cessation 


Med consult appreciated


Protonix, prn bentyl


CT scan done


Prescriptions/Medication Reconciliation: 


Aspirin [Ecotrin] 81 mg PO DAILY #20 tabec


Ergocalciferol [Drisdol 50,000 Intl Units Cap] 1 cap PO Q7D #4 cap


levETIRAcetam [Keppra] 500 mg PO BID #60 tab


Metoprolol Tartrate [Lopressor] 25 mg PO BID #30 tab


Pantoprazole [Protonix EC Tab] 20 mg PO DAILY #30 ect


traZODone [Desyrel] 50 mg PO HS PRN #30 tab


 PRN Reason: insomnia

## 2018-03-23 ENCOUNTER — HOSPITAL ENCOUNTER (EMERGENCY)
Dept: HOSPITAL 31 - C.ER | Age: 68
Discharge: HOME | End: 2018-03-23
Payer: COMMERCIAL

## 2018-03-23 VITALS
TEMPERATURE: 99 F | RESPIRATION RATE: 18 BRPM | DIASTOLIC BLOOD PRESSURE: 93 MMHG | HEART RATE: 84 BPM | SYSTOLIC BLOOD PRESSURE: 149 MMHG | OXYGEN SATURATION: 97 %

## 2018-03-23 VITALS — BODY MASS INDEX: 29 KG/M2

## 2018-03-23 DIAGNOSIS — F11.10: Primary | ICD-10-CM

## 2018-03-23 DIAGNOSIS — I10: ICD-10-CM

## 2018-03-23 NOTE — C.PDOC
History Of Present Illness


66 y/o M presents requesting heroin detox. Patient denies fever, chills, chest 

pain, dyspnea, nausea, vomiting, diarrhea, numbness, weakness. Last use this 

morning.


Time Seen by Provider: 03/23/18 12:59


Chief Complaint (Nursing): Substance Abuse





Past Medical History


Vital Signs: 





 Last Vital Signs











Temp  99.0 F   03/23/18 12:29


 


Pulse  84   03/23/18 12:29


 


Resp  18   03/23/18 12:29


 


BP  149/93 H  03/23/18 12:29


 


Pulse Ox  97   03/23/18 12:29














- Medical History


PMH: Asthma, Depression, HTN, Seizures


   Denies: Diabetes, Hepatitis, HIV, Chronic Kidney Disease, Sexually 

Transmitted Disease





- [facility] Procedures











DETOXIFICATION SERVICES FOR SUBSTANCE ABUSE TREATMENT (02/08/18)


[facility] PSYCHOTHERAPY FOR SUBSTANCE ABUSE TREATMENT, SUPPORT (02/08/18)


[facility] PSYCHOTHERAPY FOR SUBSTANCE ABUSE, PSYCHOEDUCATION (02/08/18)


INJECT/INFUSE NEC (03/24/15)








Family History: States: Unknown Family Hx





- Social History


Hx Tobacco Use: No


Hx Alcohol Use: No


Hx Substance Use: Yes





- Immunization History


Hx Tetanus Toxoid Vaccination: No


Hx Influenza Vaccination: No


Hx Pneumococcal Vaccination: No





Review Of Systems


Except As Marked, All Systems Reviewed And Found Negative.


Constitutional: Negative for: Fever


Cardiovascular: Negative for: Chest Pain





Physical Exam





- Physical Exam


Additional Physical Exam Comments: 


Gen: NAD


Resp: Normal respiration


Neuro: Alert, no focal deficit





ED Course And Treatment


O2 Sat by Pulse Oximetry: 97





Medical Decision Making


Medical Decision Making: 


Patient recent discharged from detox, not candidate for readmission.





Disposition





- Disposition


Disposition: HOME/ ROUTINE


Disposition Time: 13:04


Condition: STABLE


Additional Instructions: 


Call , ask to be transferred to Crisis, and then ask if you can be 

admitted for detox.


Instructions:  Drug Abuse and Drug Addiction (DC)





- Clinical Impression


Clinical Impression: 


 Heroin abuse